# Patient Record
Sex: FEMALE | Race: BLACK OR AFRICAN AMERICAN | NOT HISPANIC OR LATINO | URBAN - METROPOLITAN AREA
[De-identification: names, ages, dates, MRNs, and addresses within clinical notes are randomized per-mention and may not be internally consistent; named-entity substitution may affect disease eponyms.]

---

## 2017-05-30 ENCOUNTER — EMERGENCY (EMERGENCY)
Facility: HOSPITAL | Age: 32
LOS: 1 days | Discharge: HOME | End: 2017-05-30
Attending: EMERGENCY MEDICINE | Admitting: EMERGENCY MEDICINE
Payer: MEDICAID

## 2017-05-30 VITALS
OXYGEN SATURATION: 97 % | RESPIRATION RATE: 18 BRPM | SYSTOLIC BLOOD PRESSURE: 138 MMHG | WEIGHT: 190.04 LBS | DIASTOLIC BLOOD PRESSURE: 89 MMHG | HEART RATE: 88 BPM | TEMPERATURE: 98 F

## 2017-05-30 DIAGNOSIS — L02.412 CUTANEOUS ABSCESS OF LEFT AXILLA: ICD-10-CM

## 2017-05-30 DIAGNOSIS — Z79.899 OTHER LONG TERM (CURRENT) DRUG THERAPY: ICD-10-CM

## 2017-05-30 DIAGNOSIS — Z88.0 ALLERGY STATUS TO PENICILLIN: ICD-10-CM

## 2017-05-30 PROCEDURE — 99283 EMERGENCY DEPT VISIT LOW MDM: CPT

## 2017-05-30 RX ORDER — TRAMADOL HYDROCHLORIDE 50 MG/1
1 TABLET ORAL
Qty: 8 | Refills: 0 | OUTPATIENT
Start: 2017-05-30

## 2017-05-30 NOTE — ED PROVIDER NOTE - PHYSICAL EXAMINATION
CONSTITUTIONAL: Well-appearing; well-nourished; in no apparent distress.   HEAD: Normocephalic; atraumatic.   EYES: conjunctiva and sclera clear  ENT: normal nose; no rhinorrhea;    CARDIOVASCULAR:  Regular rate and rhythm.   RESPIRATORY: Breathing easily  EXT: No cyanosis or edema; N/V intact  SKIN: Normal for age and race; warm; dry; good turgor; left axilla with 1.5cm area of induration with central sinus formation, no exudate, no fluctuance, mild diffuse surrounding erythema extending 1-2cm from induration, + TTP  NEURO: A & O x 3;   PSYCHOLOGICAL: The patient’s mood and manner are appropriate.

## 2017-05-30 NOTE — ED ADULT NURSE NOTE - OBJECTIVE STATEMENT
presented to ED with abscess to left underarm noted since last Wednesday and now open. + bloody drainage as per pt. Unknown Etiology

## 2017-05-30 NOTE — ED PROVIDER NOTE - OBJECTIVE STATEMENT
33yo woman with c/o arm abscess, since this previous Friday. she notes dull aching pain to the area and that it popped on saturday. Pt has no F/C, no further drainage. No previous h/o abscess.

## 2017-05-30 NOTE — ED PROVIDER NOTE - MEDICAL DECISION MAKING DETAILS
pt with abscess that she popped yesterday, no exudate nor fluctuance now however mild surrounding cellulitis. Will treat with abx, pt given return precautions.

## 2017-07-28 ENCOUNTER — EMERGENCY (EMERGENCY)
Facility: HOSPITAL | Age: 32
LOS: 1 days | Discharge: HOME | End: 2017-07-28
Admitting: EMERGENCY MEDICINE
Payer: MEDICAID

## 2017-07-28 VITALS
DIASTOLIC BLOOD PRESSURE: 78 MMHG | WEIGHT: 199.96 LBS | HEART RATE: 76 BPM | RESPIRATION RATE: 18 BRPM | OXYGEN SATURATION: 99 % | SYSTOLIC BLOOD PRESSURE: 139 MMHG | HEIGHT: 62 IN | TEMPERATURE: 98 F

## 2017-07-28 DIAGNOSIS — Z79.2 LONG TERM (CURRENT) USE OF ANTIBIOTICS: ICD-10-CM

## 2017-07-28 DIAGNOSIS — Z88.0 ALLERGY STATUS TO PENICILLIN: ICD-10-CM

## 2017-07-28 DIAGNOSIS — L02.416 CUTANEOUS ABSCESS OF LEFT LOWER LIMB: ICD-10-CM

## 2017-07-28 DIAGNOSIS — Z79.899 OTHER LONG TERM (CURRENT) DRUG THERAPY: ICD-10-CM

## 2017-07-28 PROCEDURE — 10060 I&D ABSCESS SIMPLE/SINGLE: CPT | Mod: LT

## 2017-07-28 PROCEDURE — 99283 EMERGENCY DEPT VISIT LOW MDM: CPT | Mod: 25

## 2017-07-28 PROCEDURE — 10060 I&D ABSCESS SIMPLE/SINGLE: CPT

## 2017-07-28 RX ORDER — TRAMADOL HYDROCHLORIDE 50 MG/1
1 TABLET ORAL
Qty: 12 | Refills: 0
Start: 2017-07-28 | End: 2017-07-30

## 2017-07-28 NOTE — ED PROVIDER NOTE - OBJECTIVE STATEMENT
31 y/o female c/o abscess to left knee x 6 days. pt states small pimple present which has enlarged and become painful. no fever or chills. no d/c. no trauma. no further complaints

## 2017-07-28 NOTE — ED PROVIDER NOTE - PHYSICAL EXAMINATION
2cm superficial abscess to anterior lateral left knee. mild overlying redness. no streaking. FROM. distal NVI

## 2017-07-28 NOTE — ED PROVIDER NOTE - MEDICAL DECISION MAKING DETAILS
left knee superficial abscess. no evidence of joint involvement on exam. a febrile. ID done. will tx with clindamycin, tramadol prn pain and wound check in 2 days. return precautions d/w pt

## 2017-07-28 NOTE — ED ADULT NURSE NOTE - OBJECTIVE STATEMENT
Patient presents to the ED complaining of an abscess to the left knee since last Saturday. Patient reports that it started as a pimple. Complaining of pain, redness noted to the site. Denies any fever or chills.

## 2017-07-28 NOTE — ED ADULT TRIAGE NOTE - ARRIVAL INFO ADDITIONAL COMMENTS
Pt presented to ED with left knee pain. Redness noted the area. Pt denies fever, chills, chest pain, SOB, dizziness.

## 2017-11-27 ENCOUNTER — EMERGENCY (EMERGENCY)
Facility: HOSPITAL | Age: 32
LOS: 1 days | Discharge: HOME | End: 2017-11-27
Attending: EMERGENCY MEDICINE | Admitting: EMERGENCY MEDICINE
Payer: MEDICAID

## 2017-11-27 VITALS
WEIGHT: 199.96 LBS | HEIGHT: 62 IN | RESPIRATION RATE: 16 BRPM | HEART RATE: 80 BPM | DIASTOLIC BLOOD PRESSURE: 90 MMHG | OXYGEN SATURATION: 99 % | TEMPERATURE: 98 F | SYSTOLIC BLOOD PRESSURE: 137 MMHG

## 2017-11-27 DIAGNOSIS — M54.9 DORSALGIA, UNSPECIFIED: ICD-10-CM
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
## 2017-11-27 DIAGNOSIS — M25.551 PAIN IN RIGHT HIP: ICD-10-CM
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
## 2017-11-27 DIAGNOSIS — Z88.0 ALLERGY STATUS TO PENICILLIN: ICD-10-CM

## 2017-11-27 DIAGNOSIS — Z79.899 OTHER LONG TERM (CURRENT) DRUG THERAPY: ICD-10-CM
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
## 2017-11-27 PROCEDURE — 73502 X-RAY EXAM HIP UNI 2-3 VIEWS: CPT

## 2017-11-27 PROCEDURE — 99283 EMERGENCY DEPT VISIT LOW MDM: CPT | Mod: 25

## 2017-11-27 PROCEDURE — 99284 EMERGENCY DEPT VISIT MOD MDM: CPT | Mod: 25

## 2017-11-27 PROCEDURE — 73502 X-RAY EXAM HIP UNI 2-3 VIEWS: CPT | Mod: 26

## 2017-11-27 PROCEDURE — 73502 X-RAY EXAM HIP UNI 2-3 VIEWS: CPT | Mod: 26,RT

## 2017-11-27 RX ORDER — IBUPROFEN 200 MG
600 TABLET ORAL ONCE
Qty: 0 | Refills: 0 | Status: COMPLETED | OUTPATIENT
Start: 2017-11-27 | End: 2017-11-27

## 2017-11-27 RX ADMIN — Medication 600 MILLIGRAM(S): at 09:55

## 2017-11-27 NOTE — ED ADULT NURSE NOTE - OTHER COMPLAINTS
pt c.o mechanical fall approx two weeks ago. c.o R hip, R lower back and R leg pain. pt able to ambulate independently without difficulty.

## 2017-11-27 NOTE — ED PROVIDER NOTE - CHIEF COMPLAINT
The patient is a 32y Female complaining of fall. The patient is a 32y Female complaining of fall and hip pain

## 2017-11-27 NOTE — ED PROVIDER NOTE - OBJECTIVE STATEMENT
33 yo female with hx of chronic back pain presents with right sided back pain and right hip pain after she fell 2 weeks ago. Fall was mechanical. Pt states she tripped and fell over a toy and has been having pain since then. Has been taking ibuprofen and Tramadol for pain but it continues. Has not taken any pain meds today. No numbness/ tingling/ weakness/ bowel or bladder complaints/ gross hematuria/ Cp/SOB/ abd pain. Pt has been ambulating easily since the incident with some pain. No other complaints. No head trauma or LOC.

## 2017-11-27 NOTE — ED PROVIDER NOTE - MEDICAL DECISION MAKING DETAILS
31 yo female presents with right hip and back pain after fall 2 weeks ago. XRs with no acute findings. Pt ambulating easily in ED and with no neuro complaints. Advised OTC pain meds prn pain.

## 2017-11-27 NOTE — ED ADULT NURSE NOTE - OBJECTIVE STATEMENT
c/o Fall / trip and fall back hurting on right hip , lower back. Started 2 weeks ago. Right leg radiation. Taken muscle relaxer, Tramadol. Under Pain management chronic back pain.

## 2020-05-12 NOTE — ED ADULT NURSE NOTE - HARM RISK FACTORS
She is well-developed. She is ill-appearing. She is not toxic-appearing or diaphoretic. HENT:      Head: Normocephalic and atraumatic. Eyes:      Pupils: Pupils are equal, round, and reactive to light. Neck:      Musculoskeletal: Normal range of motion and neck supple. Cardiovascular:      Rate and Rhythm: Normal rate and regular rhythm. Heart sounds: No murmur. No friction rub. No gallop. Pulmonary:      Effort: Pulmonary effort is normal. No respiratory distress. Breath sounds: Normal breath sounds. No wheezing or rales. Chest:      Chest wall: No tenderness. Abdominal:      General: Bowel sounds are normal. There is no distension. Palpations: Abdomen is soft. Tenderness: There is no abdominal tenderness. Musculoskeletal: Normal range of motion. Skin:     General: Skin is warm and dry. Neurological:      Mental Status: She is alert and oriented to person, place, and time. Cranial Nerves: No cranial nerve deficit.       Coordination: Coordination normal.         LABS:  CBC:   Lab Results   Component Value Date    WBC 15.4 05/12/2020    RBC 2.47 05/12/2020    HGB 7.8 05/12/2020    HCT 23.8 05/12/2020    MCV 96.2 05/12/2020    MCH 31.5 05/12/2020    MCHC 32.7 05/12/2020    RDW 18.3 05/12/2020     05/12/2020    MPV 9.0 12/02/2014     CBC with Differential:   Lab Results   Component Value Date    WBC 15.4 05/12/2020    RBC 2.47 05/12/2020    HGB 7.8 05/12/2020    HCT 23.8 05/12/2020     05/12/2020    MCV 96.2 05/12/2020    MCH 31.5 05/12/2020    MCHC 32.7 05/12/2020    RDW 18.3 05/12/2020    LYMPHOPCT 6.7 05/12/2020    MONOPCT 4.9 05/12/2020    BASOPCT 0.3 05/12/2020    MONOSABS 0.8 05/12/2020    LYMPHSABS 1.0 05/12/2020    EOSABS 0.0 05/12/2020    BASOSABS 0.0 05/12/2020     CMP:    Lab Results   Component Value Date     05/12/2020    K 5.1 05/12/2020    K 4.6 10/24/2018     05/12/2020    CO2 25 05/12/2020    BUN 40 05/12/2020    CREATININE 0.74 no

## 2024-01-23 PROBLEM — M54.9 DORSALGIA, UNSPECIFIED: Chronic | Status: ACTIVE | Noted: 2017-11-27

## 2024-01-30 ENCOUNTER — OUTPATIENT (OUTPATIENT)
Dept: OUTPATIENT SERVICES | Facility: HOSPITAL | Age: 39
LOS: 1 days | End: 2024-01-30
Payer: MEDICAID

## 2024-01-30 VITALS
RESPIRATION RATE: 18 BRPM | HEART RATE: 84 BPM | DIASTOLIC BLOOD PRESSURE: 86 MMHG | WEIGHT: 229.06 LBS | OXYGEN SATURATION: 99 % | TEMPERATURE: 98 F | HEIGHT: 63 IN | SYSTOLIC BLOOD PRESSURE: 135 MMHG

## 2024-01-30 DIAGNOSIS — N93.9 ABNORMAL UTERINE AND VAGINAL BLEEDING, UNSPECIFIED: ICD-10-CM

## 2024-01-30 DIAGNOSIS — Z98.890 OTHER SPECIFIED POSTPROCEDURAL STATES: Chronic | ICD-10-CM

## 2024-01-30 DIAGNOSIS — E66.01 MORBID (SEVERE) OBESITY DUE TO EXCESS CALORIES: ICD-10-CM

## 2024-01-30 DIAGNOSIS — J30.2 OTHER SEASONAL ALLERGIC RHINITIS: ICD-10-CM

## 2024-01-30 DIAGNOSIS — R03.0 ELEVATED BLOOD-PRESSURE READING, WITHOUT DIAGNOSIS OF HYPERTENSION: ICD-10-CM

## 2024-01-30 DIAGNOSIS — Z90.49 ACQUIRED ABSENCE OF OTHER SPECIFIED PARTS OF DIGESTIVE TRACT: Chronic | ICD-10-CM

## 2024-01-30 DIAGNOSIS — Z98.891 HISTORY OF UTERINE SCAR FROM PREVIOUS SURGERY: Chronic | ICD-10-CM

## 2024-01-30 DIAGNOSIS — J30.2 OTHER SEASONAL ALLERGIC RHINITIS: Chronic | ICD-10-CM

## 2024-01-30 DIAGNOSIS — Z01.818 ENCOUNTER FOR OTHER PREPROCEDURAL EXAMINATION: ICD-10-CM

## 2024-01-30 LAB — BLD GP AB SCN SERPL QL: SIGNIFICANT CHANGE UP

## 2024-01-30 NOTE — H&P PST ADULT - HISTORY OF PRESENT ILLNESS
This is a yr old female with PMH of presents with c/o prolonged and heavy menses due to endometrial polyp. Pt for dilation and curettage, hysteroscopy on   This is a 52 yr old female with PMH of seasonal allergies, Hypertension not on meds, presents with c/o prolonged and heavy menses due to endometrial polyp. Pt for dilation and curettage, hysteroscopy on 2/01/2024.   This is a 38 yr old female with PMH of seasonal allergies, elevated blood pressure reading without diagnosis of hypertension, obesity who presents with c/o prolonged and heavy menses due to endometrial polyp. Pt for dilation and curettage, hysteroscopy on 2/01/2024.

## 2024-01-30 NOTE — H&P PST ADULT - NSICDXPASTSURGICALHX_GEN_ALL_CORE_FT
PAST SURGICAL HISTORY:  H/O umbilical hernia repair     History of appendectomy     History of  section

## 2024-01-30 NOTE — H&P PST ADULT - PROBLEM SELECTOR PLAN 2
Pt with elevated BP reading without diagnosis of Hypertension.  /82- repeat /86. Pt is asymptomatic. As per pt, she will return on 2/6 for BP check.  Lifestyle modifications recommended.

## 2024-01-30 NOTE — H&P PST ADULT - PROBLEM SELECTOR PLAN 1
Pt for dilation and curettage, hysteroscopy on 2/01/2024.  YOEL stop bang score 3. Pt denies history of YOEL, never did sleep study.  Preoperative instructions discussed with pt and given to pt.   Instructed pt that she will need someone to escort her home after surgery, to notify security when she arrives in the lobby of the hospital that she is here for surgery, not to eat or drink anything after midnight the night before the surgery, to avoid NSAIDs such as Ibuprofen, Motrin, Aleve, Advil, Naproxen before surgery, to take Tylenol if needed for pain, to report if she has been exposed to any one with any contagious diseases including Covid-19 or if she is exhibiting any symptoms of COVID-19.   Instructed about use of Chlorhexidine 4% soap for 3 days before surgery including the morning of the surgery to prevent infection. Verbalized understanding of instructions given.

## 2024-01-30 NOTE — H&P PST ADULT - ASSESSMENT
This is a 38 yr old female with PMH of seasonal allergies, elevated blood pressure reading without diagnosis of hypertension, obesity who presents with abnormal uterine and vaginal bleeding. Pt for dilation and curettage, hysteroscopy on 2/01/2024.  YOEL stop bang score 3. Pt denies history of YOEL, never did sleep study.

## 2024-01-30 NOTE — H&P PST ADULT - NSANTHOSAYNRD_GEN_A_CORE
No. YOEL screening performed.  STOP BANG Legend: 0-2 = LOW Risk; 3-4 = INTERMEDIATE Risk; 5-8 = HIGH Risk

## 2024-01-30 NOTE — H&P PST ADULT - ATTENDING COMMENTS
Mrs Sowmya Galeas is a 38 y.o.  who presents today for scheduled hysteroscopy, polypectomy, D&C   AUB-P. Patient reports heavy and prolonged bleeding; EMB showed a polyp  Sono: EMS 17 mm (U 11.8 x 6 cm)    r/b/a were extensively discussed, risks including but not limited to pain, bleeding, infection, damage to adjacent organs, VTE, need for laparoscopy and laparotomy, inability to complete the procedure, need for blood transfusion.  Patient voiced understanding and agreed with the plan.  [email or web address]

## 2024-01-30 NOTE — H&P PST ADULT - NSICDXFAMILYHX_GEN_ALL_CORE_FT
FAMILY HISTORY:  Father  Still living? Unknown  Family history of hypertension, Age at diagnosis: Age Unknown    Mother  Still living? Unknown  Family history of hypertension, Age at diagnosis: Age Unknown    Grandparent  Still living? No  Family history of cerebrovascular accident (CVA), Age at diagnosis: Age Unknown    Aunt  Still living? Yes, Estimated age: Age Unknown  Family history of hypertension, Age at diagnosis: Age Unknown  Family history of renal cancer, Age at diagnosis: Age Unknown

## 2024-01-30 NOTE — H&P PST ADULT - NSICDXPROCEDURE_GEN_ALL_CORE_FT
PROCEDURES:  Dilation and curettage with polypectomy 30-Jan-2024 11:50:04  Natividad Locke  Hysteroscopy, with dilation and curettage 30-Jan-2024 11:51:48  Natividad Locke

## 2024-01-31 ENCOUNTER — TRANSCRIPTION ENCOUNTER (OUTPATIENT)
Age: 39
End: 2024-01-31

## 2024-01-31 PROCEDURE — G0463: CPT

## 2024-02-01 ENCOUNTER — OUTPATIENT (OUTPATIENT)
Dept: OUTPATIENT SERVICES | Facility: HOSPITAL | Age: 39
LOS: 1 days | End: 2024-02-01
Payer: MEDICAID

## 2024-02-01 ENCOUNTER — TRANSCRIPTION ENCOUNTER (OUTPATIENT)
Age: 39
End: 2024-02-01

## 2024-02-01 VITALS
TEMPERATURE: 98 F | DIASTOLIC BLOOD PRESSURE: 90 MMHG | OXYGEN SATURATION: 96 % | WEIGHT: 229.06 LBS | HEIGHT: 63 IN | RESPIRATION RATE: 16 BRPM | SYSTOLIC BLOOD PRESSURE: 140 MMHG | HEART RATE: 92 BPM

## 2024-02-01 VITALS
TEMPERATURE: 98 F | RESPIRATION RATE: 15 BRPM | SYSTOLIC BLOOD PRESSURE: 142 MMHG | HEART RATE: 78 BPM | OXYGEN SATURATION: 97 % | DIASTOLIC BLOOD PRESSURE: 91 MMHG

## 2024-02-01 DIAGNOSIS — N93.9 ABNORMAL UTERINE AND VAGINAL BLEEDING, UNSPECIFIED: ICD-10-CM

## 2024-02-01 DIAGNOSIS — Z98.891 HISTORY OF UTERINE SCAR FROM PREVIOUS SURGERY: Chronic | ICD-10-CM

## 2024-02-01 DIAGNOSIS — Z90.49 ACQUIRED ABSENCE OF OTHER SPECIFIED PARTS OF DIGESTIVE TRACT: Chronic | ICD-10-CM

## 2024-02-01 DIAGNOSIS — Z98.890 OTHER SPECIFIED POSTPROCEDURAL STATES: Chronic | ICD-10-CM

## 2024-02-01 DIAGNOSIS — Z01.818 ENCOUNTER FOR OTHER PREPROCEDURAL EXAMINATION: ICD-10-CM

## 2024-02-01 LAB
BLD GP AB SCN SERPL QL: SIGNIFICANT CHANGE UP
HCG UR QL: NEGATIVE — SIGNIFICANT CHANGE UP

## 2024-02-01 PROCEDURE — 58558 HYSTEROSCOPY BIOPSY: CPT

## 2024-02-01 PROCEDURE — 88305 TISSUE EXAM BY PATHOLOGIST: CPT

## 2024-02-01 PROCEDURE — 88305 TISSUE EXAM BY PATHOLOGIST: CPT | Mod: 26

## 2024-02-01 PROCEDURE — 81025 URINE PREGNANCY TEST: CPT

## 2024-02-01 PROCEDURE — 86900 BLOOD TYPING SEROLOGIC ABO: CPT

## 2024-02-01 PROCEDURE — 86850 RBC ANTIBODY SCREEN: CPT

## 2024-02-01 PROCEDURE — 86901 BLOOD TYPING SEROLOGIC RH(D): CPT

## 2024-02-01 PROCEDURE — 36415 COLL VENOUS BLD VENIPUNCTURE: CPT

## 2024-02-01 RX ORDER — ONDANSETRON 8 MG/1
8 TABLET, FILM COATED ORAL EVERY 8 HOURS
Refills: 0 | Status: DISCONTINUED | OUTPATIENT
Start: 2024-02-01 | End: 2024-02-15

## 2024-02-01 RX ORDER — LORATADINE 10 MG/1
1 TABLET ORAL
Refills: 0 | DISCHARGE

## 2024-02-01 RX ORDER — KETOROLAC TROMETHAMINE 30 MG/ML
30 SYRINGE (ML) INJECTION EVERY 8 HOURS
Refills: 0 | Status: COMPLETED | OUTPATIENT
Start: 2024-02-01 | End: 2024-02-02

## 2024-02-01 RX ORDER — SODIUM CHLORIDE 9 MG/ML
3 INJECTION INTRAMUSCULAR; INTRAVENOUS; SUBCUTANEOUS EVERY 8 HOURS
Refills: 0 | Status: DISCONTINUED | OUTPATIENT
Start: 2024-02-01 | End: 2024-02-01

## 2024-02-01 RX ORDER — SIMETHICONE 80 MG/1
80 TABLET, CHEWABLE ORAL EVERY 6 HOURS
Refills: 0 | Status: DISCONTINUED | OUTPATIENT
Start: 2024-02-01 | End: 2024-02-15

## 2024-02-01 RX ORDER — HYDROMORPHONE HYDROCHLORIDE 2 MG/ML
0.5 INJECTION INTRAMUSCULAR; INTRAVENOUS; SUBCUTANEOUS
Refills: 0 | Status: DISCONTINUED | OUTPATIENT
Start: 2024-02-01 | End: 2024-02-01

## 2024-02-01 RX ORDER — HYDROMORPHONE HYDROCHLORIDE 2 MG/ML
1 INJECTION INTRAMUSCULAR; INTRAVENOUS; SUBCUTANEOUS
Refills: 0 | Status: DISCONTINUED | OUTPATIENT
Start: 2024-02-01 | End: 2024-02-01

## 2024-02-01 RX ORDER — ONDANSETRON 8 MG/1
4 TABLET, FILM COATED ORAL ONCE
Refills: 0 | Status: DISCONTINUED | OUTPATIENT
Start: 2024-02-01 | End: 2024-02-01

## 2024-02-01 RX ORDER — SODIUM CHLORIDE 9 MG/ML
1000 INJECTION, SOLUTION INTRAVENOUS
Refills: 0 | Status: DISCONTINUED | OUTPATIENT
Start: 2024-02-01 | End: 2024-02-01

## 2024-02-01 RX ADMIN — HYDROMORPHONE HYDROCHLORIDE 1 MILLIGRAM(S): 2 INJECTION INTRAMUSCULAR; INTRAVENOUS; SUBCUTANEOUS at 14:20

## 2024-02-01 NOTE — ASU PATIENT PROFILE, ADULT - NSICDXPASTMEDICALHX_GEN_ALL_CORE_FT
PAST MEDICAL HISTORY:  Abnormal uterine and vaginal bleeding     Back pain     Elevated blood pressure reading without diagnosis of hypertension     Seasonal allergies

## 2024-02-01 NOTE — ASU DISCHARGE PLAN (ADULT/PEDIATRIC) - ASU DC SPECIAL INSTRUCTIONSFT
no sex nothing in vagina no heavy lifting no pushing eat high fiber food ambulation daily as tolerated shower daily clean   'see your gynecologist in 1-2wks for follow up

## 2024-02-01 NOTE — BRIEF OPERATIVE NOTE - NSICDXBRIEFPOSTOP_GEN_ALL_CORE_FT
POST-OP DIAGNOSIS:  Abnormal uterine bleeding (AUB) 01-Feb-2024 14:07:31 d&c/hysteroscopy Neris Rivas

## 2024-02-01 NOTE — BRIEF OPERATIVE NOTE - NSICDXBRIEFPROCEDURE_GEN_ALL_CORE_FT
PROCEDURES:  Diagnostic dilatation and curettage 01-Feb-2024 14:05:35 for Abnormal Uterine Bleeding Neris Rivas  Hysteroscopy 01-Feb-2024 14:06:18 for abnormal uterine bleeding Neris Rivas

## 2024-02-01 NOTE — ASU DISCHARGE PLAN (ADULT/PEDIATRIC) - CARE PROVIDER_API CALL
Rose Gutierrez  Obstetrics and Gynecology  [address] [place] [address]  Phone: [phone]  Fax: [phone]  Established Patient  Follow Up Time: Routine

## 2024-02-05 LAB — SURGICAL PATHOLOGY STUDY: SIGNIFICANT CHANGE UP

## 2024-08-12 NOTE — ED ADULT TRIAGE NOTE - AS O2 DELIVERY
room air
You can access the FollowMyHealth Patient Portal offered by [facility] by registering at the following website: [email or web address]. By joining [facility]’s FollowMyHealth portal, you will also be able to view your health information using other applications (apps) compatible with our system.

## 2025-01-15 NOTE — ASU PATIENT PROFILE, ADULT - PRO ARRIVE FROM
Body Location Override (Optional - Billing Will Still Be Based On Selected Body Map Location If Applicable): left upper arm Detail Level: Detailed Was A Bandage Applied: Yes Punch Size In Mm: 4 Size Of Lesion In Cm (Optional): 0 Depth Of Punch Biopsy: dermis Biopsy Type: H and E Anesthesia Type: 1% lidocaine with epinephrine and a 1:10 solution of 8.4% sodium bicarbonate Anesthesia Volume In Cc: 3 Hemostasis: None Epidermal Sutures: 4-0 Nylon Wound Care: Aquaphor Dressing: pressure dressing with telfa Suture Removal: 14 days Patient Will Remove Sutures At Home?: No Lab: -[id number] Lab Facility: [id number] Path Notes (To The Dermatopathologist): See prior path [id number] Consent: Written consent was obtained and risks were reviewed including but not limited to scarring, infection, bleeding, and allergy to anesthesia. Post-Care Instructions: Reviewed post-care instructions. Keep the biopsy site dry overnight, and then apply Vaseline and bandage daily until healed. Home Suture Removal Text: Patient was provided a home suture removal kit and will remove their sutures at home.  If they have any questions or difficulties they will call the office. Notification Instructions: Patient will be notified of biopsy results. However, patient instructed to call the office if not contacted within 2 weeks. Billing Type: Third-Party Bill Information: Selecting Yes will display possible errors in your note based on the variables you have selected. This validation is only offered as a suggestion for you. PLEASE NOTE THAT THE VALIDATION TEXT WILL BE REMOVED WHEN YOU FINALIZE YOUR NOTE. IF YOU WANT TO FAX A PRELIMINARY NOTE YOU WILL NEED TO TOGGLE THIS TO 'NO' IF YOU DO NOT WANT IT IN YOUR FAXED NOTE. home

## 2025-05-09 PROBLEM — N93.9 ABNORMAL UTERINE AND VAGINAL BLEEDING, UNSPECIFIED: Chronic | Status: ACTIVE | Noted: 2024-01-30

## 2025-05-09 PROBLEM — R03.0 ELEVATED BLOOD-PRESSURE READING, WITHOUT DIAGNOSIS OF HYPERTENSION: Chronic | Status: ACTIVE | Noted: 2024-01-30

## 2025-05-09 PROBLEM — J30.2 OTHER SEASONAL ALLERGIC RHINITIS: Chronic | Status: ACTIVE | Noted: 2024-01-30

## 2025-05-12 ENCOUNTER — NON-APPOINTMENT (OUTPATIENT)
Age: 40
End: 2025-05-12

## 2025-05-12 ENCOUNTER — APPOINTMENT (OUTPATIENT)
Dept: SURGERY | Facility: CLINIC | Age: 40
End: 2025-05-12
Payer: MEDICAID

## 2025-05-12 VITALS
WEIGHT: 246 LBS | TEMPERATURE: 97.3 F | DIASTOLIC BLOOD PRESSURE: 92 MMHG | HEART RATE: 88 BPM | BODY MASS INDEX: 43.59 KG/M2 | OXYGEN SATURATION: 100 % | HEIGHT: 63 IN | SYSTOLIC BLOOD PRESSURE: 155 MMHG

## 2025-05-12 DIAGNOSIS — Z82.3 FAMILY HISTORY OF STROKE: ICD-10-CM

## 2025-05-12 DIAGNOSIS — Z82.49 FAMILY HISTORY OF ISCHEMIC HEART DISEASE AND OTHER DISEASES OF THE CIRCULATORY SYSTEM: ICD-10-CM

## 2025-05-12 DIAGNOSIS — Z87.19 PERSONAL HISTORY OF OTHER DISEASES OF THE DIGESTIVE SYSTEM: ICD-10-CM

## 2025-05-12 DIAGNOSIS — F10.90 ALCOHOL USE, UNSPECIFIED, UNCOMPLICATED: ICD-10-CM

## 2025-05-12 DIAGNOSIS — Z80.9 FAMILY HISTORY OF MALIGNANT NEOPLASM, UNSPECIFIED: ICD-10-CM

## 2025-05-12 PROCEDURE — 99203 OFFICE O/P NEW LOW 30 MIN: CPT

## 2025-05-12 PROCEDURE — G2211 COMPLEX E/M VISIT ADD ON: CPT | Mod: NC

## 2025-05-14 ENCOUNTER — APPOINTMENT (OUTPATIENT)
Dept: ULTRASOUND IMAGING | Facility: CLINIC | Age: 40
End: 2025-05-14
Payer: MEDICAID

## 2025-05-14 ENCOUNTER — OUTPATIENT (OUTPATIENT)
Dept: OUTPATIENT SERVICES | Facility: HOSPITAL | Age: 40
LOS: 1 days | End: 2025-05-14

## 2025-05-14 DIAGNOSIS — Z98.891 HISTORY OF UTERINE SCAR FROM PREVIOUS SURGERY: Chronic | ICD-10-CM

## 2025-05-14 DIAGNOSIS — Z90.49 ACQUIRED ABSENCE OF OTHER SPECIFIED PARTS OF DIGESTIVE TRACT: Chronic | ICD-10-CM

## 2025-05-14 DIAGNOSIS — Z98.890 OTHER SPECIFIED POSTPROCEDURAL STATES: Chronic | ICD-10-CM

## 2025-05-14 PROCEDURE — 76705 ECHO EXAM OF ABDOMEN: CPT | Mod: 26

## 2025-05-16 DIAGNOSIS — E66.01 MORBID (SEVERE) OBESITY DUE TO EXCESS CALORIES: ICD-10-CM

## 2025-05-16 DIAGNOSIS — R19.00 INTRA-ABDOMINAL AND PELVIC SWELLING, MASS AND LUMP, UNSPECIFIED SITE: ICD-10-CM

## 2025-05-19 ENCOUNTER — APPOINTMENT (OUTPATIENT)
Dept: BARIATRICS | Facility: CLINIC | Age: 40
End: 2025-05-19

## 2025-06-30 NOTE — ED ADULT TRIAGE NOTE - HEIGHT IN FEET
5 CHIEF COMPLAINT  Medicare Annual Wellness Visit Subsequent    HISTORY OF PRESENT ILLNESS  Amanda Nunes is a 65 y.o. female presents today for follow up of Medicare Annual Wellness Visit Subsequent    HPI    Past Medical, Surgical, and Family History reviewed and updated in chart.  Reviewed all medications by prescribing practitioner or clinical pharmacist (such as prescriptions, OTCs, herbal therapies and supplements) and documented in the medical record.    Stable on current meds, needs refills.  Will return for fasting labs.  Follows with [employer].  Considering Ozempic, but concerned about potential for GI side effects.  States she went through the form on the Ozempic website and was approved for free medication.  Stays active, but no formal exercise routine.     REVIEW OF SYSTEMS  Review of Systems   Constitutional:  Negative for chills and fever.   Respiratory:  Negative for cough and shortness of breath.    Cardiovascular:  Negative for chest pain, palpitations and leg swelling.   Gastrointestinal:  Negative for abdominal pain, constipation, diarrhea, nausea and vomiting.   Musculoskeletal:  Positive for arthralgias and back pain.   Skin:  Negative for rash.   Neurological:  Negative for headaches.   Psychiatric/Behavioral:  Negative for dysphoric mood.        ALLERGIES  Sulfa (sulfonamide antibiotics)    MEDICATIONS  Current Outpatient Medications   Medication Instructions    ergocalciferol, vitamin D2, (VITAMIN D2 ORAL) Take by mouth.    lisinopril 20 mg, oral, Daily    rosuvastatin (CRESTOR) 5 mg, oral, Every evening       TOBACCO USE  Tobacco Use History[1]    DEPRESSION SCREEN  Over the past 2 weeks, how often have you been bothered by any of the following problems?  Little interest or pleasure in doing things: Not at all  Feeling down, depressed, or hopeless: Not at all    SURGICAL HISTORY  Past Surgical History:  06/26/2014: COLONOSCOPY      Comment:  Complete Colonoscopy  04/14/2014: OTHER  "SURGICAL HISTORY      Comment:  Oophorectomy Unilateral Left Side  07/13/2021: OTHER SURGICAL HISTORY      Comment:  Complete colonoscopy       OBJECTIVE    /83   Pulse 66   Temp 36.1 °C (97 °F)   Ht 1.664 m (5' 5.5\")   Wt 95.3 kg (210 lb 3.2 oz)   SpO2 100%   BMI 34.45 kg/m²    BMI: Estimated body mass index is 34.45 kg/m² as calculated from the following:    Height as of this encounter: 1.664 m (5' 5.5\").    Weight as of this encounter: 95.3 kg (210 lb 3.2 oz).    BP Readings from Last 3 Encounters:   06/30/25 125/83   11/11/24 126/84   06/25/24 113/86      Wt Readings from Last 3 Encounters:   06/30/25 95.3 kg (210 lb 3.2 oz)   11/11/24 93.6 kg (206 lb 4.8 oz)   06/25/24 93.8 kg (206 lb 12.8 oz)        PHYSICAL EXAM  Physical Exam  Constitutional:       Appearance: Normal appearance.   HENT:      Head: Normocephalic and atraumatic.      Right Ear: Tympanic membrane and ear canal normal.      Left Ear: Tympanic membrane and ear canal normal.   Neck:      Vascular: No carotid bruit.   Cardiovascular:      Rate and Rhythm: Normal rate and regular rhythm.      Heart sounds: No murmur heard.  Pulmonary:      Effort: No respiratory distress.      Breath sounds: Normal breath sounds. No wheezing.   Abdominal:      General: There is no distension.      Palpations: Abdomen is soft.      Tenderness: There is no abdominal tenderness.   Musculoskeletal:      Right lower leg: No edema.      Left lower leg: No edema.   Neurological:      General: No focal deficit present.      Mental Status: She is alert and oriented to person, place, and time. Mental status is at baseline.   Psychiatric:         Mood and Affect: Mood normal.         Behavior: Behavior normal.         Thought Content: Thought content normal.         Judgment: Judgment normal.          ASSESSMENT AND PLAN  Assessment/Plan   Problem List Items Addressed This Visit       Benign essential hypertension    Relevant Medications    lisinopril 20 mg tablet "    Other Relevant Orders    Comprehensive Metabolic Panel    CBC and Auto Differential    Hemoglobin A1c    Mixed hyperlipidemia    Relevant Medications    rosuvastatin (Crestor) 5 mg tablet    Other Relevant Orders    Lipid Panel    Hemoglobin A1c    Hyperglycemia    Relevant Orders    Hemoglobin A1c    Medicare annual wellness visit, subsequent - Primary    Screening for multiple conditions    Overview   5 minutes were spent screening for depression using PHQ2/PHQ9 as documented in the chart.           Patient has healthcare proxy and living will    Visit for screening mammogram    Relevant Orders    BI mammo bilateral screening tomosynthesis     Medicare Wellness Visit    Hypertension - at goal, continue current medications and healthy habits.    - Aim for 30 minutes of aerobic exercise, 5 times per week.  Try to cut back on processed foods, including foods made with flour, sugar, added salt, and saturated fat.     Mixed hyperlipidemia - continue statin.     Routine fasting labs - CBC, CMP, FLP.     Impaired fasting glucose - check A1c.      Mammogram ordered.      Colonoscopy 24.       Reviewed signs of skin cancer and importance of sun protection.     Continue to stay current with routine dental and eye exams.     Prevnar 20 & Shingrix are recommended.    Has Living Will and Healthcare POA.     Follow-up in 6 months.            [1]   Social History  Tobacco Use   Smoking Status Some Days    Current packs/day: 0.00    Average packs/day: 1 pack/day for 29.0 years (29.0 ttl pk-yrs)    Types: Cigarettes    Start date:     Last attempt to quit:     Years since quittin.5   Smokeless Tobacco Never

## (undated) DEVICE — TUBING STRYKER HYSTEROSCOPY INFLOW OUTFLOW

## (undated) DEVICE — DRAPE TOWEL BLUE 17" X 24"

## (undated) DEVICE — DRAPE 1/2 SHEET 40X57"

## (undated) DEVICE — WARMING BLANKET UPPER ADULT

## (undated) DEVICE — SOL IRR POUR NS 0.9% 1500ML

## (undated) DEVICE — VENODYNE/SCD SLEEVE CALF MEDIUM

## (undated) DEVICE — GOWN LG

## (undated) DEVICE — DRAPE LIGHT HANDLE COVER (BLUE)

## (undated) DEVICE — PREP BETADINE SPONGE STICKS

## (undated) DEVICE — TUBING SET TUR BLADDER IRRIGATION Y-TYPE 81"

## (undated) DEVICE — URETERAL CATH RED RUBBER 16FR (ORANGE)

## (undated) DEVICE — SOL IRR POUR H2O 500ML

## (undated) DEVICE — LAP PAD W RING 18 X 18"

## (undated) DEVICE — ELCTR CUTTING LOOP MONOPOLAR ANGLED 24F

## (undated) DEVICE — PACK PERI GYN

## (undated) DEVICE — TUBING TUR 2 PRONG

## (undated) DEVICE — DRAPE LEGGINGS 6" CUFF 28X43"

## (undated) DEVICE — GLV 7.5 PROTEXIS (WHITE)

## (undated) DEVICE — SOL INJ NS 0.9% 500ML 1-PORT

## (undated) DEVICE — SOL IRR SORBITOL 3% 3000ML